# Patient Record
(demographics unavailable — no encounter records)

---

## 2025-01-13 NOTE — ASSESSMENT
[Educated Patient & Family about Diagnosis] : educated the patient and family about the diagnosis [FreeTextEntry1] : 14 year old male with s/p adenoidectomy and myringotomy tubes, history of allergic rhinitis, absence seizures previously seen for feeding problems and emesis. Last seen 9/2024, excellent weight gain since then.  Recommend: -Benefiber 2 tablespoons daily if in effective restart miralax daily, titrate to effect, monitor stools -MVI daily -Nutritional supplementation, may use twice daily, if weight gain excessive may decrease and/or stop Ensure -Call in with questions, concerns, or clinical change -Follow-up in 2 months if miralax required

## 2025-01-13 NOTE — PHYSICAL EXAM
[Well Developed] : well developed [Well Nourished] : well nourished [NAD] : in no acute distress [PERRL] : pupils were equal, round, reactive to light  [Moist & Pink Mucous Membranes] : moist and pink mucous membranes [CTAB] : lungs clear to auscultation bilaterally [Regular Rate and Rhythm] : regular rate and rhythm [Normal S1, S2] : normal S1 and S2 [Soft] : soft  [Normal Bowel Sounds] : normal bowel sounds [No HSM] : no hepatosplenomegaly appreciated [No Back Lesion] : no back lesion [Rectal Exam Deferred] : rectal exam was deferred [Normal Tone] : normal tone [Well-Perfused] : well-perfused [icteric] : anicteric [Respiratory Distress] : no respiratory distress  [Distended] : non distended [Tender] : non tender [Lymphadenopathy] : no lymphadenopathy  [Joint Swelling] : no joint swelling [Edema] : no edema [Cyanosis] : no cyanosis [Rash] : no rash [Jaundice] : no jaundice

## 2025-01-13 NOTE — CONSULT LETTER
[Dear  ___] : Dear  [unfilled], [Courtesy Letter:] : I had the pleasure of seeing your patient, [unfilled], in my office today. [Please see my note below.] : Please see my note below. [Consult Closing:] : Thank you very much for allowing me to participate in the care of this patient.  If you have any questions, please do not hesitate to contact me. [Sincerely,] : Sincerely, [FreeTextEntry3] : Matheus Do, DO\par  Pediatric Gastroenterology, Liver Disease and Nutrition\par  [facility]\par  \par

## 2025-02-06 NOTE — HISTORY OF PRESENT ILLNESS
[FreeTextEntry1] : 13 year old RH boy with childhood absence epilepsy and ADHD with history of poorly controlled absence seizures presumed 2/2 poor compliance. Last clinic visit in May 2, 2024.  Interval Hx: Doing better with medication compliance - only missed 1 dose this week. No seizures noted. Passed all his classes  MOC called the clinic 6/3 regarding several breakthrough seizures noted at school ISO missing 2-3 doses of zonisamide per week. Mom does not monitor his medication compliance. Dose increased to 300mg qHS 6/3 and the importance of consistent compliance was reiterated. Reported seizure frequency has decreased since starting zonisamide per MOC, but 4/2 ambulatory EEG captured 22 electroclinical seizures and his zonisamide level 5/3 was undetectable. He was seen for well child check and was noted to be failing social studies.  History Reviewed: SHx: In 8th grade. Failing social studies. Has IEP: Extra time on tests, resource room. Teachers concerned about him not staying on task. Prior to starting school, she felt that his staring spells were much better controlled.  Previously in 7th grade he was failing math, social studies, English, science, and 2nd language. Passing only band.  Previous history reviewed: Has a history of poor adherence to medications. Prior medications he has tried were Ethosuximide as well as Valproic Acid. He was initially on Ethosuximide but then because of his behavioral challenges (aggression) he was then switched to Valproic Acid. At one point, he was on both Ethosuximide and Valproic Acid. Has a history of failing classes in school (math, social studies, English, science).  Current medications: Zonisamide 300 mg (15 mL) nightly  Failed medications: Ethosuximide VPA  AEEG (4/5/2024): This is an abnormal EEG due to the following findings: -22 electroclinical seizures were captured during this recording, characterized by runs of 3Hz GSW lasting up to 16 seconds mostly during wakefulness. -Occasional bursts of 3Hz generalized spike wave discharges lasting up to 2.5 seconds in wakefulness and sleep.  AEEG (5/13/22): Impression: ABNORMAL due to occasional regular 3Hz generalized spike and wave discharges occurring singly or in runs, with 20 runs lasting between 10-20 seconds. One 10 second burst was captured on camera during HV And was associated with eye fluttering.  Sleep study 7/2023: normal aside from the EEG which was consistent with absence epilepsy.  Inv[facility]ae Epilepsy Panel: Negative

## 2025-02-06 NOTE — ASSESSMENT
[FreeTextEntry1] : 13-year-old RH boy with ADHD and absence epilepsy on Zonisamide 300mg qhs but poor compliance presenting for follow up evaluation. Neuro exam stable. No clinical seizures since last appointment. Will draw labs and levels today. Follow up in 3-4 months.

## 2025-02-06 NOTE — QUALITY MEASURES
[Seizure frequency] : Seizure frequency: Yes [Etiology, seizure type, and epilepsy syndrome] : Etiology, seizure type, and epilepsy syndrome: Yes [Side effects of anti-seizure medications] : Side effects of anti-seizure medications: Yes [Safety and education around seizures] : Safety and education around seizures: Yes [Sudden unexpected death in epilepsy (SUDEP)] : Sudden unexpected death in epilepsy: Yes [Issues around driving] : Issues around driving: Yes [Screening for anxiety, depression] : Screening for anxiety, depression: Yes [Treatment-resistant epilepsy (every visit)] : Treatment-resistant epilepsy (every visit): Yes [Adherence to medication(s)] : Adherence to medication(s): Yes [Counseling for women of childbearing potential with epilepsy (including folic acid supplement)] : Counseling for women of childbearing potential with epilepsy (including folic acid supplement): Yes [Options for adjunctive therapy (Neurostimulation, CBD, Dietary Therapy, Epilepsy Surgery)] : Options for adjunctive therapy (Neurostimulation, CBD, Dietary Therapy, Epilepsy Surgery): Yes [25 Hydroxy Vitamin D level assessed and Vitamin D3 ordered] : 25 Hydroxy Vitamin D level assessed and Vitamin D3 ordered: Yes [Thyroid profile ordered] : Thyroid profile ordered: Yes

## 2025-02-06 NOTE — PHYSICAL EXAM
[Well-appearing] : well-appearing [Normocephalic] : normocephalic [No dysmorphic facial features] : no dysmorphic facial features [No ocular abnormalities] : no ocular abnormalities [Neck supple] : neck supple [Soft] : soft [No abnormal neurocutaneous stigmata or skin lesions] : no abnormal neurocutaneous stigmata or skin lesions [Straight] : straight [No kojo or dimples] : no kojo or dimples [No deformities] : no deformities [Alert] : alert [Well related, good eye contact] : well related, good eye contact [Conversant] : conversant [Normal speech and language] : normal speech and language [Follows instructions well] : follows instructions well [VFF] : VFF [Pupils reactive to light and accommodation] : pupils reactive to light and accommodation [Full extraocular movements] : full extraocular movements [Saccadic and smooth pursuits intact] : saccadic and smooth pursuits intact [No nystagmus] : no nystagmus [Normal facial sensation to light touch] : normal facial sensation to light touch [No facial asymmetry or weakness] : no facial asymmetry or weakness [Gross hearing intact] : gross hearing intact [Equal palate elevation] : equal palate elevation [Good shoulder shrug] : good shoulder shrug [Normal tongue movement] : normal tongue movement [Midline tongue, no fasciculations] : midline tongue, no fasciculations [R handed] : R handed [Normal axial and appendicular muscle tone] : normal axial and appendicular muscle tone [Gets up on table without difficulty] : gets up on table without difficulty [No pronator drift] : no pronator drift [Normal finger tapping and fine finger movements] : normal finger tapping and fine finger movements [No abnormal involuntary movements] : no abnormal involuntary movements [5/5 strength in proximal and distal muscles of arms and legs] : 5/5 strength in proximal and distal muscles of arms and legs [Walks and runs well] : walks and runs well [Able to do deep knee bend] : able to do deep knee bend [Able to walk on heels] : able to walk on heels [Able to walk on toes] : able to walk on toes [2+ biceps] : 2+ biceps [Triceps] : triceps [Knee jerks] : knee jerks [Ankle jerks] : ankle jerks [No ankle clonus] : no ankle clonus [Bilaterally] : bilaterally [Localizes LT and temperature] : localizes LT and temperature [No dysmetria on FTNT] : no dysmetria on FTNT [Good walking balance] : good walking balance [Normal gait] : normal gait [Able to tandem well] : able to tandem well [Negative Romberg] : negative Romberg

## 2025-02-06 NOTE — PLAN
[FreeTextEntry1] : [ ] Continue zonisamide 300mg qHS, discussed importance of ensuring he is taking medication as prescribed. [ ] CBC, CMP, VitD, TFT, zonisamide level today [ ] RTC in 3-4 months

## 2025-04-22 NOTE — ASSESSMENT
[FreeTextEntry1] : 15yo with absence epilepsy presenting for follow up. Neuro exam today is intact/nonfocal. Medication compliance has improved since MOC has taken control of administration, however, his recent aEEG shows that he continues to have breakthrough subclinical seizures. His zonisamide level at last appointment was barely therapeutic and his initial sedation side effect has improved on current dose. Will increase back to 300mg qHS. For his excessive daytime sleepiness and dysregulated sleep schedule, discussed discontinuing his daytime nap, practicing good sleep hygiene, if no improvement discussed trialing a dose of melatonin 2mg at 8pm. MOC to call in one month to check in on how his daytime sleepiness and insomnia progresses. RTC in 2-3 months for Zonisamide level.

## 2025-04-22 NOTE — HISTORY OF PRESENT ILLNESS
[FreeTextEntry1] : 14 year old RH boy with childhood absence epilepsy and ADHD with history of poorly controlled absence seizures presumed 2/2 poor medication compliance. Last clinic visit in Feb 6, 2025.  Interval Hx:  Much improved since last visit. No witnessed seizure activity. Has missed 2 doses in the past month, Mom is administering meds. No further lethargy since decreasing Zonisamide to 250mg qD. aEEG obtained March 6: Seven electrographic seizures as well as occasional 3 Hz generalized spike and wave discharges. Improved from 4/2024 aEEG which captured 22 electrographic seizures. Zonisamide level obtained at last visit was at the low limit of therapeutic range (10). He is doing better in school, recently scored 100% on an Algebra quiz.   Sleep: Was frequently falling asleep in AM classes, which stopped once MO moved his zonisamide PM dose back from 10pm to 6:30pm. He frequently takes a nap after school and wakes up late in the evening and then has trouble going back to sleep, occasionally staying up until 2am.  History Reviewed: SHx: In 9th grade. Has IEP: Extra time on tests, resource room. Teachers concerned about him not staying on task. Previously, in 7th grade he was failing math, social studies, English, science, and 2nd language. Passing only band.  Previous history reviewed: Has a history of poor adherence to medications. Prior medications he has tried were Ethosuximide as well as Valproic Acid. He was initially on Ethosuximide but then because of his behavioral challenges (aggression) he was then switched to Valproic Acid. At one point, he was on both Ethosuximide and Valproic Acid.   Current medications: Zonisamide 250 mg nightly  Failed medications: Ethosuximide VPA  AEEG (4/5/2024): This is an abnormal EEG due to the following findings: -22 electroclinical seizures were captured during this recording, characterized by runs of 3Hz GSW lasting up to 16 seconds mostly during wakefulness. -Occasional bursts of 3Hz generalized spike wave discharges lasting up to 2.5 seconds in wakefulness and sleep.  AEEG (5/13/22): Impression: ABNORMAL due to occasional regular 3Hz generalized spike and wave discharges occurring singly or in runs, with 20 runs lasting between 10-20 seconds. One 10 second burst was captured on camera during HV And was associated with eye fluttering.  Sleep study 7/2023: normal aside from the EEG which was consistent with absence epilepsy.  Inv[facility]ae Epilepsy Panel: Negative

## 2025-04-22 NOTE — ASSESSMENT
[FreeTextEntry1] : 13yo with absence epilepsy presenting for follow up. Neuro exam today is intact/nonfocal. Medication compliance has improved since MOC has taken control of administration, however, his recent aEEG shows that he continues to have breakthrough subclinical seizures. His zonisamide level at last appointment was barely therapeutic and his initial sedation side effect has improved on current dose. Will increase back to 300mg qHS. For his excessive daytime sleepiness and dysregulated sleep schedule, discussed discontinuing his daytime nap, practicing good sleep hygiene, if no improvement discussed trialing a dose of melatonin 2mg at 8pm. MOC to call in one month to check in on how his daytime sleepiness and insomnia progresses. RTC in 2-3 months for Zonisamide level.

## 2025-04-22 NOTE — QUALITY MEASURES
[Seizure frequency] : Seizure frequency: Yes [Etiology, seizure type, and epilepsy syndrome] : Etiology, seizure type, and epilepsy syndrome: Yes [Side effects of anti-seizure medications] : Side effects of anti-seizure medications: Yes [Safety and education around seizures] : Safety and education around seizures: Yes [Sudden unexpected death in epilepsy (SUDEP)] : Sudden unexpected death in epilepsy: Yes [Issues around driving] : Issues around driving: Yes [Screening for anxiety, depression] : Screening for anxiety, depression: Yes [Treatment-resistant epilepsy (every visit)] : Treatment-resistant epilepsy (every visit): Yes [Adherence to medication(s)] : Adherence to medication(s): Yes [Counseling for women of childbearing potential with epilepsy (including folic acid supplement)] : Counseling for women of childbearing potential with epilepsy (including folic acid supplement): Not Applicable [Options for adjunctive therapy (Neurostimulation, CBD, Dietary Therapy, Epilepsy Surgery)] : Options for adjunctive therapy (Neurostimulation, CBD, Dietary Therapy, Epilepsy Surgery): Not Applicable [25 Hydroxy Vitamin D level assessed and Vitamin D3 ordered] : 25 Hydroxy Vitamin D level assessed and Vitamin D3 ordered: Not Applicable [Thyroid profile ordered] : Thyroid profile ordered: Not Applicable

## 2025-04-22 NOTE — CONSULT LETTER
[Dear  ___] : Dear  [unfilled], [Consult Letter:] : I had the pleasure of evaluating your patient, [unfilled]. [Please see my note below.] : Please see my note below. [Consult Closing:] : Thank you very much for allowing me to participate in the care of this patient.  If you have any questions, please do not hesitate to contact me. [Sincerely,] : Sincerely, [FreeTextEntry3] : Alex Garrison MD PGY-4, Child Neurology [facility] and [facility] [address] [address] [facility], [address] [place] [address]  Dr. Meg An Child Neurology Attending Physician [facility] [address], [place] [phone] [place] [address]

## 2025-04-22 NOTE — END OF VISIT
[] : Resident [Time Spent: ___ minutes] : I have spent [unfilled] minutes of time on the encounter which excludes teaching and separately reported services. [FreeTextEntry3] : Souleymane is a 14 year old, RH, male with ADHD and childhood absence epilepsy previously poorly controlled on ZNG presumedly secondary to ASMs compliance (level of 10) with recent improvement in compliance. Most recent aEEG (3/2025) showed 7 electrographic seizures with 3 Hz GSW seizures improved from previous 22 seizures (4/2024) aEEG independently reviewed. Will increase  mg qHS (ideally should be in divided BID dosing for better coverage). Unremarkable [facility] Epilepsy Panel. If seizures persist despite adequate ASMs consider MRI brain w/epilepsy protocol if not previously performed. Patient is non-focal on neurological exam today without any obvious significant neurocutaneous stigmata which is reassuring. Extensive counseling and written materials provided on seizure safety, SUDEP, prognosis, daily preventative anti-seizure medication for at least 1-2 years before repeating EEG for consideration of weaning ASMs, emergency abortive medication, ED/911 usage. Counseled on Sleep Hygiene for school-age children need between 9-11 hours of sleep per night. Stick to the same bedtime and wake time every day, even on weekends. Calm and quiet bedtime routine and can trial melatonin 2 mg qHS. Follow up in 2-3 months, or sooner if symptoms worsen.  I spent a total of 40 minutes on the date of the encounter chart reviewing, evaluating, coordination of care, counseling, and treating the patient. Time spent excludes teaching

## 2025-04-22 NOTE — CONSULT LETTER
[Dear  ___] : Dear  [unfilled], [Consult Letter:] : I had the pleasure of evaluating your patient, [unfilled]. [Please see my note below.] : Please see my note below. [Consult Closing:] : Thank you very much for allowing me to participate in the care of this patient.  If you have any questions, please do not hesitate to contact me. [Sincerely,] : Sincerely, [FreeTextEntry3] : Alex Garrison MD PGY-4, Child Neurology [facility] and [facility] [address] [facility], [address] [place] [address]  Dr. Meg An Child Neurology Attending Physician [facility] [address], [place] [phone] [place] [address]

## 2025-04-22 NOTE — CONSULT LETTER
[Dear  ___] : Dear  [unfilled], [Consult Letter:] : I had the pleasure of evaluating your patient, [unfilled]. [Please see my note below.] : Please see my note below. [Consult Closing:] : Thank you very much for allowing me to participate in the care of this patient.  If you have any questions, please do not hesitate to contact me. [Sincerely,] : Sincerely, [FreeTextEntry3] : Alex Garrison MD PGY-4, Child Neurology [facility] and [facility] [address] [facility], [address] [place] [address]  Dr. Meg An Child Neurology Attending Physician [facility] [address], [place] [phone] [place] [address]

## 2025-04-22 NOTE — PLAN
[FreeTextEntry1] : [ ] Increase zonisamide to 300mg qHS [ ] Seizure safety reviewed [ ] MOC to call in 1 month to follow up on his sleep [ ] RTC in 2-3 months for labs and zonisamide level

## 2025-05-28 NOTE — CONSULT LETTER
[Dear  ___] : Dear  [unfilled], [Courtesy Letter:] : I had the pleasure of seeing your patient, [unfilled], in my office today. [Please see my note below.] : Please see my note below. [Consult Closing:] : Thank you very much for allowing me to participate in the care of this patient.  If you have any questions, please do not hesitate to contact me. [Sincerely,] : Sincerely, [FreeTextEntry3] : Kevin Valdes Caro, DO PGY-4, Child Neurology [facility] and [facility] [address] [facility], [address] [place] [address]    Dr. Audi Prince M.D. Child Neurology Attending Physician Britt and [address], [address] [place] [address]

## 2025-05-28 NOTE — QUALITY MEASURES

## 2025-05-28 NOTE — ASSESSMENT
[FreeTextEntry1] : 15yo with absence epilepsy presenting for follow up. Neuro exam today is intact/nonfocal. Medication compliance has improved since MOC has taken control of administration, however, his recent aEEG shows that he continues to have breakthrough subclinical seizures. Since increase of Zonisamide dosing back to 300mg nightly, mother now also starting to notice more clinical seizures. Will recommend addition of Ethosuximide, which patient has used previously as monotherapy, in the hopes for synergistic benefit with Zonisamide. For his excessive daytime sleepiness and dysregulated sleep schedule, discussed discontinuing his daytime nap, practicing good sleep hygiene. Will start melatonin supplementation given that patient has made some sleep hygiene changes without much benefit.

## 2025-05-28 NOTE — ASSESSMENT
[FreeTextEntry1] : 13yo with absence epilepsy presenting for follow up. Neuro exam today is intact/nonfocal. Medication compliance has improved since MOC has taken control of administration, however, his recent aEEG shows that he continues to have breakthrough subclinical seizures. Since increase of Zonisamide dosing back to 300mg nightly, mother now also starting to notice more clinical seizures. Will recommend addition of Ethosuximide, which patient has used previously as monotherapy, in the hopes for synergistic benefit with Zonisamide. For his excessive daytime sleepiness and dysregulated sleep schedule, discussed discontinuing his daytime nap, practicing good sleep hygiene. Will start melatonin supplementation given that patient has made some sleep hygiene changes without much benefit.

## 2025-05-28 NOTE — CONSULT LETTER
[Dear  ___] : Dear  [unfilled], [Courtesy Letter:] : I had the pleasure of seeing your patient, [unfilled], in my office today. [Please see my note below.] : Please see my note below. [Consult Closing:] : Thank you very much for allowing me to participate in the care of this patient.  If you have any questions, please do not hesitate to contact me. [Sincerely,] : Sincerely, [FreeTextEntry3] : Kevin Valdes Caro, DO PGY-4, Child Neurology [facility] and [facility] [address] [facility], [address] [place] [address]    Dr. Audi Prince M.D. Child Neurology Attending Physician Britt and [address], [address] [place] [address]

## 2025-05-28 NOTE — PLAN
[FreeTextEntry1] : [ ] Continue zonisamide 300mg qHS [ ] Start Zarontin 250mg BID [ ] Start melatonin 5mg nightly at 8pm [ ] Seizure safety reviewed [ ] f/u in 3 months, draw Zonisamide level at that visit

## 2025-05-28 NOTE — END OF VISIT
[] : Resident [FreeTextEntry3] : Time billed for excludes time spent on teaching.  [Time Spent: ___ minutes] : I have spent [unfilled] minutes of time on the encounter which excludes teaching and separately reported services.

## 2025-05-28 NOTE — DATA REVIEWED
[FreeTextEntry1] : Reason For Visit     EEG Recording Identification:   Type: Ambulatory, without video   Referring MD: Dr. Kevin Lawton/Dr. 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) ADHD (314.01) (F90.9) Aggressive behavior (V40.39) (R46.89) Chronic otitis media with effusion, bilateral (381.3) (H65.493) Chronic rhinitis (472.0) (J31.0) Constipation (564.00) (K59.00) Feeding difficulties (783.3) (R63.30) Feeding problem (783.3) (R63.39) Flu vaccine need (V04.81) (Z23) Heavy breathing (786.09) (R06.89) Intermittent asthma (493.90) (J45.20) Need for vaccination (V05.9) (Z23) Primary snoring (786.09) (R06.83) Seizure (780.39) (R56.9) Sleep disorder breathing (780.59) (G47.30) Snoring (786.09) (R06.83) Sprain of ankle, left (845.00) (S93.402A)  Results/Data     Recording Technique The patient underwent continuous ambulatory EEG monitoring using a cable telemetry system [employer]. The EEG was recorded from 21 electrodes using the standard 10/20 placement. The EEG was continuously sampled on disk, and spike detection and seizure detection algorithms marked portions of the EEG for further analysis offline. Data was stored on disk for important clinical events (indicated by manual pushbutton) and for periods identified by the seizure detection algorithm, and analyzed offline. EEG data was reviewed by the electroencephalographer, and selected segments were archived on compact disc. Start Time: 03/06/2025 15:35 End Time: 03/07/2025 08:02   Background in Wakefulness The background activity during wakefulness was well organized. It was comprised of symmetric mixture of frequencies appropriate for the patient's age. There was a well-modulated 9 Hz posterior dominant rhythm of  20-80 microvolts amplitude, responsive to eye opening and eye closure. A normal anterior to posterior gradient was present. As the patient became drowsy, there was an attenuation of the background activity and the appearance of widespread, irregular slower frequency activity.   Background in Drowsiness/Sleep As the patient became drowsy, there was an attenuation of the background activity and the appearance of widespread, irregular slower frequency activity. Vertex sharp transients appeared, and eventually the patient attained stage II sleep, with symmetric age appropriate sleep spindles. Normal slow wave sleep was achieved.   Slowing No focal or generalized slowing was noted.   Interictal Activity/Events Occasional 3 Hz generalized spike and wave discharges in runs lasting up to 3 seconds, in wakefulness and up to 7 seconds in sleep.   Attenuation & Asymmetry None.   Activation Procedures Intermittent photic stimulation in incremental frequencies up to 20 Hz produced one subtle electroclinical seizure up to 12 seconds of 3 Hz generalized spike and wave discharges, with possible staring followed by repeated blinking. Hyperventilation was not performed.   EKG hbvi-uu-[id number] variability on single lead EKG.   Patient Events/Itcal Activity 0 button events were recorded during the monitoring period . Log sheet was reviewed. No patient events were marked. No seizures were recorded during the monitoring period. 7 electrographic seizures were captured during this recording, characterized by runs of 3 Hz generalized spike and wave discharges lasting up to 15 seconds mostly during wakefulness. Multiple push button events without ictal correlate.   Impression This is an abnormal ambulatory EEG due to the following findings: Seven electrographic seizures as well as occasional 3 Hz generalized spike and wave discharges.   Clinical Correlation The above findings are suggestive of generalized cortical hyperexcitability with seven generalized seizures captured. The only seizure with video available seems to be associated with a behavioral arrest. In the correct clinical context suggests a generalized epilepsy syndrome. There was jvnp-ex-[id number] variability on single lead EKG and may warrant further evaluation with a 12-lead EKG. Clinical correlation advised.    Assessment Absence epilepsy (345.00) (G40.A09)  Signatures     Electronically signed by : JULIO GORE D.O.; Mar 10 2025  2:10PM Eastern Standard Time (Author) Electronically signed by : NAYLA HERRON MD; Mar 11 2025  8:09PM Eastern Standard Time (Author)

## 2025-05-28 NOTE — HISTORY OF PRESENT ILLNESS
[FreeTextEntry1] : 14 year old RH boy with childhood absence epilepsy and ADHD with history of poorly controlled absence seizures presumed 2/2 poor medication compliance. Last clinic visit in April 21st, 2025.  Interval Hx:  Mother reporting more seizure activity since last visit, including a few witnessed seizures today, however the general trend over the last few months has continued to be decreasing seizure frequency. Mother endorses medication compliance, minimal lethargy with change in Zonisamide dosing.  Sleep: Was frequently falling asleep in AM classes, which stopped once MO moved his zonisamide PM dose back from 10pm to 6:30pm. He frequently takes a nap after school and wakes up late in the evening and then has trouble going back to sleep, occasionally staying up until 2am.  History Reviewed: SHx: In 9th grade. Has IEP: Extra time on tests, resource room. Teachers concerned about him not staying on task. Previously, in 7th grade he was failing math, social studies, English, science, and 2nd language. Passing only band.  Previous history reviewed: Has a history of poor adherence to medications. Prior medications he has tried were Ethosuximide as well as Valproic Acid. He was initially on Ethosuximide but then because of his behavioral challenges (aggression) he was then switched to Valproic Acid. At one point, he was on both Ethosuximide and Valproic Acid.   Current medications: Zonisamide 250 mg nightly  Failed medications: Ethosuximide VPA  AEEG (4/5/2024): This is an abnormal EEG due to the following findings: -22 electroclinical seizures were captured during this recording, characterized by runs of 3Hz GSW lasting up to 16 seconds mostly during wakefulness. -Occasional bursts of 3Hz generalized spike wave discharges lasting up to 2.5 seconds in wakefulness and sleep.  AEEG . aEEG obtained March 6: Seven electrographic seizures as well as occasional 3 Hz generalized spike and wave discharges.  Sleep study 7/2023: normal aside from the EEG which was consistent with absence epilepsy.  [facility] Epilepsy Panel: Negative

## 2025-05-28 NOTE — DATA REVIEWED
[FreeTextEntry1] : Reason For Visit     EEG Recording Identification:   Type: Ambulatory, without video   Referring MD: Dr. Kevin Lawton/Dr. 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) ADHD (314.01) (F90.9) Aggressive behavior (V40.39) (R46.89) Chronic otitis media with effusion, bilateral (381.3) (H65.493) Chronic rhinitis (472.0) (J31.0) Constipation (564.00) (K59.00) Feeding difficulties (783.3) (R63.30) Feeding problem (783.3) (R63.39) Flu vaccine need (V04.81) (Z23) Heavy breathing (786.09) (R06.89) Intermittent asthma (493.90) (J45.20) Need for vaccination (V05.9) (Z23) Primary snoring (786.09) (R06.83) Seizure (780.39) (R56.9) Sleep disorder breathing (780.59) (G47.30) Snoring (786.09) (R06.83) Sprain of ankle, left (845.00) (S93.402A)  Results/Data     Recording Technique The patient underwent continuous ambulatory EEG monitoring using a cable telemetry system [employer]. The EEG was recorded from 21 electrodes using the standard 10/20 placement. The EEG was continuously sampled on disk, and spike detection and seizure detection algorithms marked portions of the EEG for further analysis offline. Data was stored on disk for important clinical events (indicated by manual pushbutton) and for periods identified by the seizure detection algorithm, and analyzed offline. EEG data was reviewed by the electroencephalographer, and selected segments were archived on compact disc. Start Time: 03/06/2025 15:35 End Time: 03/07/2025 08:02   Background in Wakefulness The background activity during wakefulness was well organized. It was comprised of symmetric mixture of frequencies appropriate for the patient's age. There was a well-modulated 9 Hz posterior dominant rhythm of  20-80 microvolts amplitude, responsive to eye opening and eye closure. A normal anterior to posterior gradient was present. As the patient became drowsy, there was an attenuation of the background activity and the appearance of widespread, irregular slower frequency activity.   Background in Drowsiness/Sleep As the patient became drowsy, there was an attenuation of the background activity and the appearance of widespread, irregular slower frequency activity. Vertex sharp transients appeared, and eventually the patient attained stage II sleep, with symmetric age appropriate sleep spindles. Normal slow wave sleep was achieved.   Slowing No focal or generalized slowing was noted.   Interictal Activity/Events Occasional 3 Hz generalized spike and wave discharges in runs lasting up to 3 seconds, in wakefulness and up to 7 seconds in sleep.   Attenuation & Asymmetry None.   Activation Procedures Intermittent photic stimulation in incremental frequencies up to 20 Hz produced one subtle electroclinical seizure up to 12 seconds of 3 Hz generalized spike and wave discharges, with possible staring followed by repeated blinking. Hyperventilation was not performed.   EKG cmfq-yo-ohty variability on single lead EKG.   Patient Events/Itcal Activity 0 button events were recorded during the monitoring period . Log sheet was reviewed. No patient events were marked. No seizures were recorded during the monitoring period. 7 electrographic seizures were captured during this recording, characterized by runs of 3 Hz generalized spike and wave discharges lasting up to 15 seconds mostly during wakefulness. Multiple push button events without ictal correlate.   Impression This is an abnormal ambulatory EEG due to the following findings: Seven electrographic seizures as well as occasional 3 Hz generalized spike and wave discharges.   Clinical Correlation The above findings are suggestive of generalized cortical hyperexcitability with seven generalized seizures captured. The only seizure with video available seems to be associated with a behavioral arrest. In the correct clinical context suggests a generalized epilepsy syndrome. There was pkbe-nw-dggp variability on single lead EKG and may warrant further evaluation with a 12-lead EKG. Clinical correlation advised.    Assessment Absence epilepsy (345.00) (G40.A09)  Signatures     Electronically signed by : JULIO GORE D.O.; Mar 10 2025  2:10PM Eastern Standard Time (Author) Electronically signed by : NAYLA HERRON MD; Mar 11 2025  8:09PM Eastern Standard Time (Author)

## 2025-06-10 NOTE — HISTORY OF PRESENT ILLNESS
[Mother] : mother [Up to date] : Up to date [Has family members/adults to turn to for help] : has family members/adults to turn to for help [Sleep Concerns] : sleep concerns [No] : Patient has not had sexual intercourse [Has problems with sleep] : has problems with sleep [With Teen] : teen [With Parent/Guardian] : parent/guardian [NO] : No [Is permitted and is able to make independent decisions] : Is not permitted and is not able to make independent decisions [Uses electronic nicotine delivery system] : does not use electronic nicotine delivery system [Uses tobacco] : does not use tobacco [Uses drugs] : does not use drugs  [Drinks alcohol] : does not drink alcohol [FreeTextEntry7] : 15 y/o male w history of absence epilepsy, ADHD, presenting for [facility]. Pt reports occasional dry cough ongoing for 2-3 weeks, worse this past weekend. Associated sore throat, nasal congestion, runny nose. Slightly muffled hearing in both ears, thinks bc of congestion. Took Nyquil with mild symptom relief. States brother has similar symptoms, no other sick contacts. No fevers, chills, headaches, facial pain, or difficulty breathing. Mom reports nosebleed yesterday and this AM, self-resolving within a few minutes. No frequent or recurrent nosebleeds. No other new health concerns at this time. Pt follows closely w neurology for management of epilepsy, currently taking zonisamide and ethosuximide for seizure control, zonisamide dose recently decreased and ethosuximide added d/t GI upset on previous zonisamide dose. Mom reports improvement in seizure frequency however seizures are not yet fully controlled. Pt not yet cleared by neurology to take ADHD medication.  [de-identified] : Pt reports some frustration, feels as though he can't leave house by himself and like he isn't given much independence. [de-identified] : Pt is in highschool, ongoing issues w attentiveness and grades complicated by hx of absence seizures, recent improvement in school with less sleeping in class. Teacher told mom he is doing very well, continuing to work on school performance. Currently not allowed to play lacrosse d/t grades. [de-identified] : Pt reports low appetite at baseline, some GI upset with seizure medications, some difficulty eating jovana d/t sore throat [de-identified] : Has friends at school but limited social life especially outside of school hours. [de-identified] :  Sleeps ~5h/night and takes 3-4 h naps during day after school, was prescribed melatonin to help w sleep hygiene but has been unable to access d/t prohibitive cost. Reports is often bored and/or low mood, gets frustrated with himself and hits himself, no other SH behavior, no SI, no HI. PHQ9 administered w score of 10. Recently started following with therapist.

## 2025-06-10 NOTE — PHYSICAL EXAM

## 2025-06-10 NOTE — REVIEW OF SYSTEMS
[Difficulty with Sleep] : difficulty with sleep [Cough] : cough [Shortness of Breath] : shortness of breath [Appetite Changes] : appetite changes [Seizure] : seizures [Negative] : Genitourinary

## 2025-06-10 NOTE — HISTORY OF PRESENT ILLNESS
[Mother] : mother [Up to date] : Up to date [Has family members/adults to turn to for help] : has family members/adults to turn to for help [Sleep Concerns] : sleep concerns [No] : Patient has not had sexual intercourse [Has problems with sleep] : has problems with sleep [With Teen] : teen [With Parent/Guardian] : parent/guardian [NO] : No [Is permitted and is able to make independent decisions] : Is not permitted and is not able to make independent decisions [Uses electronic nicotine delivery system] : does not use electronic nicotine delivery system [Uses tobacco] : does not use tobacco [Uses drugs] : does not use drugs  [Drinks alcohol] : does not drink alcohol [FreeTextEntry7] : 15 y/o male w history of absence epilepsy, ADHD, presenting for [facility]. Pt reports occasional dry cough ongoing for 2-3 weeks, worse this past weekend. Associated sore throat, nasal congestion, runny nose. Slightly muffled hearing in both ears, thinks bc of congestion. Took Nyquil with mild symptom relief. States brother has similar symptoms, no other sick contacts. No fevers, chills, headaches, facial pain, or difficulty breathing. Mom reports nosebleed yesterday and this AM, self-resolving within a few minutes. No frequent or recurrent nosebleeds. No other new health concerns at this time. Pt follows closely w neurology for management of epilepsy, currently taking zonisamide and ethosuximide for seizure control, zonisamide dose recently decreased and ethosuximide added d/t GI upset on previous zonisamide dose. Mom reports improvement in seizure frequency however seizures are not yet fully controlled. Pt not yet cleared by neurology to take ADHD medication.  [de-identified] : Pt reports some frustration, feels as though he can't leave house by himself and like he isn't given much independence. [de-identified] : Pt is in highschool, ongoing issues w attentiveness and grades complicated by hx of absence seizures, recent improvement in school with less sleeping in class. Teacher told mom he is doing very well, continuing to work on school performance. Currently not allowed to play lacrosse d/t grades. [de-identified] : Pt reports low appetite at baseline, some GI upset with seizure medications, some difficulty eating jovana d/t sore throat [de-identified] : Has friends at school but limited social life especially outside of school hours. [de-identified] :  Sleeps ~5h/night and takes 3-4 h naps during day after school, was prescribed melatonin to help w sleep hygiene but has been unable to access d/t prohibitive cost. Reports is often bored and/or low mood, gets frustrated with himself and hits himself, no other SH behavior, no SI, no HI. PHQ9 administered w score of 10. Recently started following with therapist.

## 2025-06-10 NOTE — RISK ASSESSMENT
[PHQ-9 Positive] : PHQ-9 Positive [I have developed a follow-up plan documented below in the note.] : I have developed a follow-up plan documented below in the note. [Have you ever had exercise-related chest pain or shortness of breath?] : Have you ever had exercise-related chest pain or shortness of breath? Yes [No Increased risk of SCA or SCD] : No Increased risk of SCA or SCD    [Have you ever fainted, passed out or had an unexplained seizure suddenly and without warning, especially during exercise or in response] : Have you ever fainted, passed out or had an unexplained seizure suddenly and without warning, especially during exercise or in response to sudden loud noises such as doorbells, alarm clocks and ringing telephones? No [Has anyone in your immediate family (parents, grandparents, siblings) or other more distant relatives (aunts, uncles, cousins)  of heart] : Has anyone in your immediate family (parents, grandparents, siblings) or other more distant relatives (aunts, uncles, cousins)  of heart problems or had an unexpected sudden death before age 50 (This would include unexpected drownings, unexplained car accidents in which the relative was driving or sudden infant death syndrome.)? No [Are you related to anyone with hypertrophic cardiomyopathy or hypertrophic obstructive cardiomyopathy, Marfan syndrome, arrhythmogenic] : Are you related to anyone with hypertrophic cardiomyopathy or hypertrophic obstructive cardiomyopathy, Marfan syndrome, arrhythmogenic right ventricular cardiomyopathy, long QT syndrome, short QT syndrome, Brugada syndrome or catecholaminergic polymorphic ventricular tachycardia, or anyone younger than 50 years with a pacemaker or implantable defibrillator? No

## 2025-06-10 NOTE — DISCUSSION/SUMMARY
[Normal Growth] : growth [No Elimination Concerns] : elimination [Continue Regimen] : feeding [No Skin Concerns] : skin [Anticipatory Guidance Given] : Anticipatory guidance addressed as per the history of present illness section [Physical Growth and Development] : physical growth and development [Social and Academic Competence] : social and academic competence [Emotional Well-Being] : emotional well-being [Risk Reduction] : risk reduction [Violence and Injury Prevention] : violence and injury prevention [No Medication Changes] : no medication changes [Patient] : patient [Parent/Guardian] : Parent/Guardian [Full Activity without restrictions including Physical Education & Athletics] : Full Activity without restrictions including Physical Education & Athletics [FreeTextEntry1] : 13 y/o male with relatively complicated pmhx including hx absence epilepsy, ADHD, asthma, presenting for [facility] and endorsing a few weeks of intermittent dry cough.   #Persistent cough: hx of intermittent asthma now with persistent cough suspected d/t asthma in setting of URI. Also with SOB when exercising, likely pulmonary and asthma induced.  -Rx provided for new albuterol inhaler, before exercise and then PRN for wheeze and cough pt will trial and call back if no improvement or further concerns.  #Sleep issues: pt is managed by neurology for this, recommended further discussion w them at next appointment. Informed mom of option to obtain letter of medical necessity for melatonin if needed.  #Absence Seizures: Uncontrolled, difficult to obtain when last seizure was but mother reports that frequency has decreased. Due to uncontrolled seizures he would benefit from home care.  -Ethosuximide 250mg BID -Zonisaide 300 mg daily  -Continue following with neurology for management of epilepsy -Patient care advocate requesting M11Q form for pt, will work on obtaining  #Depression, Anxiety: Moderate depression and anxiety follows with a therapist. Does not get much social interaction and may benefit from teen group therapy.  -Reach out to psychologist regarding availability   #WCV *SCHOOL: difficulty with grades due to absence seizures- failing some classes  *DIET/GROWTH: varied diet , poor appetite, slow weight gain but no weight loss recently  *DENTAL/VISION: follows with dentist, passed vision screen *HEADSS:  concerns in area of : SI/Psych: No SI, has moderate depression, see above for plan   *VACCINES: up to date *SCREENINGS: up to date  *ANTICIPATORY GUIDANCE Anticipatory guidance for teenagers focuses on supporting their physical, mental, and social-emotional development.   Physical Health: Puberty, sexual health, healthy lifestyle (diet, exercise, sleep). Mental Health: Stress management, emotional regulation, mental health awareness, and seeking help when needed. Social-Emotional Development: Healthy relationships, peer pressure, bullying, online safety, and responsible decision-making. Behavioral Health: Substance use prevention, safe driving. Academic & Future Planning: School success, career exploration, and planning for the future. *NEXT APPT: 1 year for WCV

## 2025-06-26 NOTE — ASSESSMENT
[FreeTextEntry1] : #comedonal acne, inflammatory  chronic flaring start 4% BP wash  start clindamycin lotion in AM start tretinoin 0.025% cream at bedtime. - Pt instructed on how to apply topical tretinoin (pea sized amount to entire face). Advised on the potential side effects of topical retinoids including redness, irritation and peeling of the skin, and how using an oil-free moisturizer after application or escalating up the dosing frequency to nightly (i.e. starting with every other night) may help mitigate these side effects. Also explained that a treatment duration of 8 weeks is typically required to achieve significant results. Discussed pregnancy contraindication.